# Patient Record
Sex: MALE | Race: WHITE | NOT HISPANIC OR LATINO | Employment: FULL TIME | URBAN - METROPOLITAN AREA
[De-identification: names, ages, dates, MRNs, and addresses within clinical notes are randomized per-mention and may not be internally consistent; named-entity substitution may affect disease eponyms.]

---

## 2023-05-29 ENCOUNTER — APPOINTMENT (EMERGENCY)
Dept: RADIOLOGY | Facility: HOSPITAL | Age: 38
End: 2023-05-29

## 2023-05-29 ENCOUNTER — HOSPITAL ENCOUNTER (EMERGENCY)
Facility: HOSPITAL | Age: 38
Discharge: HOME | End: 2023-05-29
Attending: EMERGENCY MEDICINE

## 2023-05-29 VITALS
SYSTOLIC BLOOD PRESSURE: 128 MMHG | HEART RATE: 94 BPM | OXYGEN SATURATION: 94 % | DIASTOLIC BLOOD PRESSURE: 79 MMHG | RESPIRATION RATE: 18 BRPM

## 2023-05-29 DIAGNOSIS — S83.92XA LEFT KNEE SPRAIN: Primary | ICD-10-CM

## 2023-05-29 RX ORDER — KETOROLAC TROMETHAMINE 30 MG/ML
30 INJECTION, SOLUTION INTRAMUSCULAR; INTRAVENOUS ONCE
Status: COMPLETED | OUTPATIENT
Start: 2023-05-29 | End: 2023-05-29

## 2023-05-29 RX ADMIN — KETOROLAC TROMETHAMINE 30 MG: 30 INJECTION, SOLUTION INTRAMUSCULAR at 04:43

## 2023-05-29 NOTE — ED PROVIDER NOTES
History  Chief Complaint   Patient presents with   • Knee Pain     Patient was playing with his kids and felt his left knee pop around 5 pm yesterday  Patient is a 70-year-old male who presents for evaluation of left knee pain after injury yesterday  He jumped off of a jungle gym and felt a pop in his left medial knee  He describes sharp stabbing left knee pain and stiffness  Patient has been using crutches because he had difficulty bearing weight  No significant swelling  No weakness numbness or paresthesias left lower extremity  He took a previously prescribed Percocet yesterday with some improvement of his symptoms  Also ibuprofen about 4 hours ago  Patient otherwise denies injury  No surgery of the left knee in the past           None       History reviewed  No pertinent past medical history  History reviewed  No pertinent surgical history  History reviewed  No pertinent family history  I have reviewed and agree with the history as documented  E-Cigarette/Vaping     E-Cigarette/Vaping Substances     Social History     Tobacco Use   • Smoking status: Never   • Smokeless tobacco: Never   Substance Use Topics   • Alcohol use: Yes     Alcohol/week: 3 0 standard drinks of alcohol     Types: 3 Cans of beer per week   • Drug use: Not Currently       Review of Systems   Constitutional: Negative for fever  HENT: Negative for sore throat  Eyes: Negative for photophobia  Respiratory: Negative for shortness of breath  Cardiovascular: Negative for chest pain  Gastrointestinal: Negative for abdominal pain  Genitourinary: Negative for dysuria  Musculoskeletal: Negative for back pain  Left knee pain   Skin: Negative for rash  Neurological: Negative for light-headedness  Hematological: Negative for adenopathy  Psychiatric/Behavioral: Negative for agitation  All other systems reviewed and are negative  Physical Exam  Physical Exam  Vitals reviewed     Constitutional: General: He is not in acute distress  Appearance: He is well-developed  HENT:      Head: Normocephalic  Eyes:      Pupils: Pupils are equal, round, and reactive to light  Cardiovascular:      Rate and Rhythm: Normal rate and regular rhythm  Heart sounds: Normal heart sounds  No murmur heard  No friction rub  No gallop  Pulmonary:      Effort: Pulmonary effort is normal       Breath sounds: Normal breath sounds  Abdominal:      General: Bowel sounds are normal  There is no distension  Palpations: Abdomen is soft  Tenderness: There is no abdominal tenderness  There is no guarding  Musculoskeletal:         General: Normal range of motion  Cervical back: Normal range of motion and neck supple  Comments: Patient with medial knee tenderness  No joint effusion noted  Pain with valgus stress  Skin:     Capillary Refill: Capillary refill takes less than 2 seconds  Neurological:      Mental Status: He is alert and oriented to person, place, and time  Cranial Nerves: No cranial nerve deficit  Sensory: No sensory deficit  Motor: No abnormal muscle tone  Psychiatric:         Behavior: Behavior normal          Thought Content:  Thought content normal          Judgment: Judgment normal          Vital Signs  ED Triage Vitals [05/29/23 0432]   Temp Pulse Respirations Blood Pressure SpO2   -- 94 18 128/79 94 %      Temp src Heart Rate Source Patient Position - Orthostatic VS BP Location FiO2 (%)   -- Monitor Sitting Left arm --      Pain Score       8           Vitals:    05/29/23 0432   BP: 128/79   Pulse: 94   Patient Position - Orthostatic VS: Sitting         Visual Acuity      ED Medications  Medications   ketorolac (TORADOL) injection 30 mg (30 mg Intramuscular Given 5/29/23 0443)       Diagnostic Studies  Results Reviewed     None                 XR knee 4+ vw left injury   ED Interpretation by Danelle Maravilla MD (05/29 0501)   ED wet read: No acute fracture noted                 Procedures  Procedures         ED Course                               SBIRT 22yo+    Flowsheet Row Most Recent Value   Initial Alcohol Screen: US AUDIT-C     1  How often do you have a drink containing alcohol? 0 Filed at: 05/29/2023 0432   2  How many drinks containing alcohol do you have on a typical day you are drinking? 0 Filed at: 05/29/2023 0432   3a  Male UNDER 65: How often do you have five or more drinks on one occasion? 0 Filed at: 05/29/2023 0432   3b  FEMALE Any Age, or MALE 65+: How often do you have 4 or more drinks on one occassion? 0 Filed at: 05/29/2023 0432   Audit-C Score 0 Filed at: 05/29/2023 0952   MARLYN: How many times in the past year have you    Used an illegal drug or used a prescription medication for non-medical reasons? Never Filed at: 05/29/2023 9792                    Medical Decision Making  Patient is a 22-year-old male who presents for evaluation of left knee pain after injury yesterday  Concern for possible MCL sprain  Neurovascular intact  Imaging reviewed no obvious of acute fracture or dislocation  Placed knee immobilizer and given crutches with orthopedic follow-up given and strict return precautions  Amount and/or Complexity of Data Reviewed  Radiology: ordered and independent interpretation performed  Risk  Prescription drug management  Disposition  Final diagnoses:   Left knee sprain     Time reflects when diagnosis was documented in both MDM as applicable and the Disposition within this note     Time User Action Codes Description Comment    5/29/2023  5:02 AM Barron Foot Add Zefir Palin  92XA] Left knee sprain       ED Disposition     ED Disposition   Discharge    Condition   Stable    Date/Time   Mon May 29, 2023  5:01 AM    Comment   Quilla Counter discharge to home/self care                 Follow-up Information     Follow up With Specialties Details Why Contact Info Additional [address]  Emergency Department Emergency Medicine  If symptoms worsen [address] [facility] [address] Tasha Gamble [id number] [phone] [facility] Emergency Department, [address] [place] [address] AFFILIATED WITH [place] [address] Melanie Porter DO Orthopedic Surgery   [address] [address]  [address] [facility] [address]  [phone]             Patient's Medications    No medications on file           PDMP Review     None          ED Provider  Electronically Signed by           Yulisa Watts MD  05/29/23 1121

## 2023-05-29 NOTE — DISCHARGE INSTRUCTIONS
-You may use Tylenol 1000 mg and ibuprofen 800 mg 3 times a day each for the next 3 days to help with pain    Please follow-up with orthopedics and use knee immobilizer and crutches until cleared by orthopedics

## 2023-06-02 VITALS
BODY MASS INDEX: 29.26 KG/M2 | SYSTOLIC BLOOD PRESSURE: 111 MMHG | WEIGHT: 216 LBS | HEART RATE: 72 BPM | DIASTOLIC BLOOD PRESSURE: 63 MMHG | RESPIRATION RATE: 16 BRPM | HEIGHT: 72 IN

## 2023-06-02 DIAGNOSIS — M23.92 INTERNAL DERANGEMENT OF LEFT KNEE: Primary | ICD-10-CM

## 2023-06-02 RX ORDER — NAPROXEN 500 MG/1
500 TABLET ORAL 2 TIMES DAILY WITH MEALS
Qty: 60 TABLET | Refills: 0 | Status: SHIPPED | OUTPATIENT
Start: 2023-06-02

## 2023-06-02 NOTE — TELEPHONE ENCOUNTER
Scheduled appt for MRI of the left knee wo contrast at [facility] on 6/9/23 at 11:45 am      I attempt to call pt from two different numbers  Unable to reach him  I did leave a detailed message about his appt  If he had questions to call us and let us know

## 2023-06-02 NOTE — LETTER
June 2, 2023     Patient: Juan Maldonado  YOB: 1985  Date of Visit: 6/2/2023      To Whom it May Concern:    Juan Maldonado is under my professional care  Pravin Bello was seen in my office on 6/2/2023  I am recommending that patient remain on light duty with no walking on the left leg  Patient will be referred for MRI   Recommending light duty until MRI results return  If you have any questions or concerns, please don't hesitate to call           Sincerely,          Vona Harada, DO        CC: No Recipients

## 2023-06-02 NOTE — PROGRESS NOTES
Subjective:    Chief Complaint   Patient presents with   • Left Knee - Pain       Sera Quinonez is a 45 y o  male complains of left knee pain  Onset of the symptoms was a week ago  Mechanism of injury: was playing with his kids and jumped over a fence, when he landed he hyperextended his knee and felt a pop  Aggravating factors: walking , weight bearing and flexion of the knee  Treatment to date: avoidance of offending activity and knee immobilizer  Symptoms have progressed to a point and plateaued  The following portions of the patient's history were reviewed and updated as appropriate: allergies, current medications, past family history, past medical history, past social history, past surgical history and problem list     Occupation:      Review of Systems   Constitutional: Negative for fever  HENT: Negative for dental problem and headaches  Eyes: Negative for vision loss  Respiratory: Negative for cough and shortness of breath  Cardiovascular: Negative for leg swelling and palpitations  Gastrointestinal: Negative for constipation and diarrhea  Genitourinary: Negative for bladder incontinence and difficulty urinating  Musculoskeletal: Negative for back pain and difficulty walking  Skin: Negative for rash and ulcer  Neurological: Negative for dizziness and headaches  Hem/Lymph/Immuno: Negative for blood clots  Does not bruise/bleed easily  Psychiatric/Behavioral: Negative for confusion  Objective:  /63 (BP Location: Left arm, Patient Position: Sitting, Cuff Size: Adult)   Pulse 72   Resp 16   Ht 6' (1 829 m)   Wt 98 kg (216 lb)   BMI 29 29 kg/m²   Skin: no rashes, lesions, skin discolorations, lacerations  Vasculature: normal popliteal and pedal pulse, normal skin color, normal capillary refill in extremity, no lower extremity edema  Neurologic: Neurologic exam is normal throughout lower extremities, Awake, alert, and oriented x3, no apparent distress  Musculoskeletal: Left KNEE EXAM  Gait: limping gait positive  Inspection:No erythema, no induration  There is no gross deformity  Palpation: Swelling: negative  Effusion: large effusion noted  Medial joint line TTP: positive  Lateral joint line TTP: negative  ROM: limited flexion  Full extension  Special tests: [facility]'s: positive medial mcmurrays  Instability to varus/valgus stress: negative  Anterior Drawer: positive Lachman's test: posiitve  Posterior Drawer: negative  Crepitus: negative        Imaging:       Assessment/Plan:  1  Internal derangement of left knee    > 45 min devoted to review of previous, pertinent medical records, imaging, discussion of treatment options, counseling and documentation  Clinical suspicion is that patient suffered from ACL tear and concomitant meniscus injury  Recommending follow-up with an MRI of the left knee for further evaluation  In regards to work patient is advised to remain on sedentary duty with no walking or use of the left lower extremity  Hinged knee brace for comfort use  Naproxen 500 mg twice daily for pain relief  Advised to continue icing and elevating  Therapeutic joint aspiration was offered however patient declines  Follow up  once MRI results return  Should sx's worsen or any concerns arise, they were advised to follow up sooner or seek more immediate medical attention  All of the patient's concerns were addressed and questions answered  They verbalized agreement with and understanding of the treatment plan  - MRI knee left  wo contrast; Future  - naproxen (Naprosyn) 500 mg tablet; Take 1 tablet (500 mg total) by mouth 2 (two) times a day with meals  Dispense: 60 tablet;  Refill: 0  - Durable Medical Equipment